# Patient Record
(demographics unavailable — no encounter records)

---

## 2025-04-25 NOTE — HISTORY OF PRESENT ILLNESS
[FreeTextEntry1] : Patient presents for annual examination She has no GYN complaints Current medications are metformin, ramipril and glimepiride She has no medication allergies Family history is significant for maternal breast cancer at age 79 There is a history of transumbilical breast implants

## 2025-04-25 NOTE — PLAN
[FreeTextEntry1] : Annual well woman encounter A Pap smear was performed Patient has commenced an exercise regimen and is encouraged to continue with same Last bone density test was 2022 with normal results Patient is due for colonoscopy and she will be scheduling that Patient is due for mammogram and breast ultrasound and she is given a referral BRCA testing has been offered follow-up 1 year or as needed

## 2025-04-25 NOTE — PHYSICAL EXAM
[Chaperoned Physical Exam] : A chaperone was present in the examining room during all aspects of the physical examination. [MA] : MA [FreeTextEntry2] : Briana Vasques [Appropriately responsive] : appropriately responsive [Alert] : alert [No Acute Distress] : no acute distress [No Lymphadenopathy] : no lymphadenopathy [Soft] : soft [Non-tender] : non-tender [Non-distended] : non-distended [No HSM] : No HSM [No Lesions] : no lesions [No Mass] : no mass [Oriented x3] : oriented x3 [FreeTextEntry6] : Bilateral implants.  No masses axillary adenopathy or nipple discharge [Labia Majora] : normal [Labia Minora] : normal [Normal] : normal [Uterine Adnexae] : non-palpable [FreeTextEntry9] : Rectal is negative and guaiac is negative